# Patient Record
Sex: MALE | Race: WHITE | NOT HISPANIC OR LATINO | Employment: OTHER | URBAN - METROPOLITAN AREA
[De-identification: names, ages, dates, MRNs, and addresses within clinical notes are randomized per-mention and may not be internally consistent; named-entity substitution may affect disease eponyms.]

---

## 2021-08-23 ENCOUNTER — APPOINTMENT (OUTPATIENT)
Dept: RADIOLOGY | Facility: MEDICAL CENTER | Age: 71
End: 2021-08-23
Attending: EMERGENCY MEDICINE
Payer: COMMERCIAL

## 2021-08-23 ENCOUNTER — HOSPITAL ENCOUNTER (EMERGENCY)
Facility: MEDICAL CENTER | Age: 71
End: 2021-08-23
Attending: EMERGENCY MEDICINE
Payer: COMMERCIAL

## 2021-08-23 VITALS
WEIGHT: 185 LBS | SYSTOLIC BLOOD PRESSURE: 149 MMHG | DIASTOLIC BLOOD PRESSURE: 74 MMHG | RESPIRATION RATE: 18 BRPM | OXYGEN SATURATION: 96 % | HEART RATE: 88 BPM | HEIGHT: 71 IN | TEMPERATURE: 99.4 F | BODY MASS INDEX: 25.9 KG/M2

## 2021-08-23 DIAGNOSIS — S01.01XA LACERATION OF SCALP, INITIAL ENCOUNTER: ICD-10-CM

## 2021-08-23 DIAGNOSIS — S09.90XA CLOSED HEAD INJURY, INITIAL ENCOUNTER: ICD-10-CM

## 2021-08-23 PROCEDURE — 304999 HCHG REPAIR-SIMPLE/INTERMED LEVEL 1

## 2021-08-23 PROCEDURE — 700101 HCHG RX REV CODE 250: Performed by: EMERGENCY MEDICINE

## 2021-08-23 PROCEDURE — 303747 HCHG EXTRA SUTURE

## 2021-08-23 PROCEDURE — 700111 HCHG RX REV CODE 636 W/ 250 OVERRIDE (IP): Performed by: EMERGENCY MEDICINE

## 2021-08-23 PROCEDURE — 99283 EMERGENCY DEPT VISIT LOW MDM: CPT

## 2021-08-23 PROCEDURE — 304217 HCHG IRRIGATION SYSTEM

## 2021-08-23 PROCEDURE — 90471 IMMUNIZATION ADMIN: CPT

## 2021-08-23 PROCEDURE — 70450 CT HEAD/BRAIN W/O DYE: CPT

## 2021-08-23 PROCEDURE — 90715 TDAP VACCINE 7 YRS/> IM: CPT | Performed by: EMERGENCY MEDICINE

## 2021-08-23 RX ORDER — LIDOCAINE HYDROCHLORIDE AND EPINEPHRINE BITARTRATE 20; .01 MG/ML; MG/ML
10 INJECTION, SOLUTION SUBCUTANEOUS ONCE
Status: COMPLETED | OUTPATIENT
Start: 2021-08-23 | End: 2021-08-23

## 2021-08-23 RX ADMIN — CLOSTRIDIUM TETANI TOXOID ANTIGEN (FORMALDEHYDE INACTIVATED), CORYNEBACTERIUM DIPHTHERIAE TOXOID ANTIGEN (FORMALDEHYDE INACTIVATED), BORDETELLA PERTUSSIS TOXOID ANTIGEN (GLUTARALDEHYDE INACTIVATED), BORDETELLA PERTUSSIS FILAMENTOUS HEMAGGLUTININ ANTIGEN (FORMALDEHYDE INACTIVATED), BORDETELLA PERTUSSIS PERTACTIN ANTIGEN, AND BORDETELLA PERTUSSIS FIMBRIAE 2/3 ANTIGEN 0.5 ML: 5; 2; 2.5; 5; 3; 5 INJECTION, SUSPENSION INTRAMUSCULAR at 12:01

## 2021-08-23 RX ADMIN — LIDOCAINE HYDROCHLORIDE AND EPINEPHRINE 10 ML: 20; 10 INJECTION, SOLUTION INFILTRATION; PERINEURAL at 11:00

## 2021-08-23 NOTE — ED PROVIDER NOTES
"ED Provider Note    CHIEF COMPLAINT  Chief Complaint   Patient presents with   • T-5000 GLF   • Head Injury       HPI  Adalberto Galloway is a 70 y.o. male who presents for evaluation of a head injury sustained last night.  He reports drinking more alcohol than he is used to, at some point when he was in his hotel room he must of fallen.  He woke up this morning with a laceration on his head and noted a lot of blood in the hotel room.  He has no complaints.  No headache or chest pain or abdominal pain or back pain or neck pain, no focal weakness numbness or tingling.  He is visiting here from [place] for a golf tournament.  No other acute complaints.    REVIEW OF SYSTEMS  Negative for fever, rash, chest pain, dyspnea, abdominal pain, back pain. All other systems are negative.     PAST MEDICAL HISTORY  Denies pertinent past medical history    SOCIAL HISTORY  Social History     Tobacco Use   • Smoking status: Not on file   Substance and Sexual Activity   • Alcohol use:  Occasional   • Drug use:  Denies   • Sexual activity:  Wife       SURGICAL HISTORY  patient denies any surgical history    CURRENT MEDICATIONS  I personally reviewed the medication list in the charting documentation.     ALLERGIES  No Known Allergies    PHYSICAL EXAM  VITAL SIGNS: BP (!) 169/80   Pulse 87   Temp 37.4 °C (99.4 °F) (Temporal)   Resp 18   Ht 1.803 m (5' 11\")   Wt 83.9 kg (185 lb)   SpO2 97%   BMI 25.80 kg/m²   Constitutional: Well appearing patient in no acute distress.  Awake and alert, not toxic nor ill in appearance.  HENT: Normocephalic, a 5 cm essentially linear with irregular margins, subcutaneous in depth, located in the frontal scalp just right of midline.  Neck: Comfortable movement without any obvious restriction in the range of motion.  Eyes: Conjunctiva normal, Non-icteric.   Chest: Normal nonlabored respirations.  Skin: The exposed portions of skin reveal no obvious rash or other abnormalities.  Musculoskeletal: No " obvious restriction in the range of motion in all major joints.   Neurologic: Alert, No obvious focal deficits noted.  Normal and symmetric upper and lower extremity motor and sensory function bilaterally.  Psychiatric: Affect normal for clinical presentation    DIAGNOSTIC STUDIES / PROCEDURES    RADIOLOGY     CT-HEAD W/O   Final Result      1.  Generalized cerebral atrophy.   2.  No acute intracranial abnormality.   3.  Right frontal scalp laceration.              Laceration Repair Procedure Note    Indication: Laceration    Procedure: The patient was placed in the appropriate position and anesthesia around the laceration was obtained by infiltration using 1% Lidocaine with epinephrine. The area was then irrigated with normal saline, explored with no foreign bodies discovered and draped in a sterile fashion. The laceration was closed with 4-0 Ethilon using interrupted sutures. There were no additional lacerations requiring repair.     Total repaired wound length: 5 cm.     Other Items: Suture count: 7    The patient tolerated the procedure well.    Complications: None            COURSE & MEDICAL DECISION MAKING  Pertinent Labs & Imaging studies reviewed. (See chart for details)    Encounter Summary: This is a very pleasant 70 y.o. male who unfortunately required evaluation in the emergency department today with head injury sustained last night while he is intoxicated, comes in today with a large laceration, has no other complaints, no focal neurologic complaints or findings, because the patient was intoxicated does not recall this injury a CT of his head is obtained and is negative for acute intracranial injury.  His laceration has been repaired after anesthetization and copious irrigation.  His tetanus has been updated.  At this point is being discharged home in stable condition with strict return instructions provided      DISPOSITION: Discharge Home      FINAL IMPRESSION  1. Laceration of scalp, initial  encounter    2. Closed head injury, initial encounter        This dictation was created using voice recognition software. The accuracy of the dictation is limited to the abilities of the software. I expect there may be some errors of grammar and possibly content. The nursing notes were reviewed and certain aspects of this information were incorporated into this note.    Electronically signed by: Trey Escalante M.D., 8/23/2021 10:52 AM

## 2021-08-23 NOTE — ED NOTES
Pt ready for discharge.  Pt instructions provided.  Pt verbalized understanding & signed.  Leaves ER ambulatory, steady gait, no distress.  All possessions with pt upon discharge.  Cab called for pt.

## 2021-08-23 NOTE — ED TRIAGE NOTES
BIB [facility] to [facility] w/ c/o a lac to head secondary to a glf last night.  Pt reports he is visiting [place] for a golf tournament, drank 3-4 beers along with 2 shots last night.  States typically does not consume hard alcohol.  Had a fall, no recall of event, awoke in bed this am and discovered the lac.  Bleeding controlled. Denies pain.  Neuro intact.  Pt in gown, on monitor, chart up for ERP.